# Patient Record
Sex: FEMALE | URBAN - METROPOLITAN AREA
[De-identification: names, ages, dates, MRNs, and addresses within clinical notes are randomized per-mention and may not be internally consistent; named-entity substitution may affect disease eponyms.]

---

## 2019-03-01 ENCOUNTER — OFFICE VISIT (OUTPATIENT)
Dept: FAMILY MEDICINE CLINIC | Age: 54
End: 2019-03-01

## 2019-03-01 VITALS
HEART RATE: 61 BPM | OXYGEN SATURATION: 96 % | DIASTOLIC BLOOD PRESSURE: 75 MMHG | SYSTOLIC BLOOD PRESSURE: 115 MMHG | TEMPERATURE: 97.1 F | WEIGHT: 271 LBS | HEIGHT: 66 IN | BODY MASS INDEX: 43.55 KG/M2 | RESPIRATION RATE: 18 BRPM

## 2019-03-01 DIAGNOSIS — R10.9 RIGHT FLANK PAIN: ICD-10-CM

## 2019-03-01 DIAGNOSIS — M25.551 RIGHT HIP PAIN: ICD-10-CM

## 2019-03-01 DIAGNOSIS — F41.9 ANXIETY: ICD-10-CM

## 2019-03-01 DIAGNOSIS — E66.01 OBESITY, MORBID (HCC): ICD-10-CM

## 2019-03-01 DIAGNOSIS — I10 ESSENTIAL HYPERTENSION: Primary | ICD-10-CM

## 2019-03-01 LAB
BILIRUB UR QL STRIP: NEGATIVE
GLUCOSE UR-MCNC: NEGATIVE MG/DL
KETONES P FAST UR STRIP-MCNC: NEGATIVE MG/DL
PH UR STRIP: 5.5 [PH] (ref 4.6–8)
PROT UR QL STRIP: NEGATIVE
SP GR UR STRIP: 1.02 (ref 1–1.03)
UA UROBILINOGEN AMB POC: NORMAL (ref 0.2–1)
URINALYSIS CLARITY POC: NORMAL
URINALYSIS COLOR POC: YELLOW
URINE BLOOD POC: NEGATIVE
URINE LEUKOCYTES POC: NEGATIVE
URINE NITRITES POC: NEGATIVE

## 2019-03-01 RX ORDER — CARVEDILOL 25 MG/1
TABLET ORAL
Refills: 0 | COMMUNITY
Start: 2019-02-15 | End: 2019-05-31 | Stop reason: SDUPTHER

## 2019-03-01 RX ORDER — ENALAPRIL MALEATE 10 MG/1
20 TABLET ORAL
COMMUNITY

## 2019-03-01 RX ORDER — ENALAPRIL MALEATE 20 MG/1
TABLET ORAL
COMMUNITY
Start: 2019-03-01 | End: 2019-03-15 | Stop reason: ALTCHOICE

## 2019-03-01 RX ORDER — HYDROXYZINE 25 MG/1
25 TABLET, FILM COATED ORAL
COMMUNITY

## 2019-03-01 RX ORDER — ESCITALOPRAM OXALATE 10 MG/1
TABLET ORAL
Qty: 30 TAB | Refills: 0 | Status: SHIPPED | OUTPATIENT
Start: 2019-03-01 | End: 2019-03-15

## 2019-03-01 NOTE — PROGRESS NOTES
Chief Complaint Patient presents with [facility] Establish Care New patient Last pap on Monday and mammogram 1 month ago Moved from [place] 1.5 years ago Discuss pelvic pain and right flank pain
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
## 2019-03-01 NOTE — PATIENT INSTRUCTIONS
I have started you on an anti-depressant combo anti-anxiety medication today. The most common side effect that you can experience is nausea during the first week but this will subside. If for some reason the nausea persists for more than two weeks or any other unwanted side effect, call our office to discuss changes in your therapy. You must take this medication daily and not miss a dose. Be aware that you will not feel any difference in the way you feel until approximately two weeks after taking your pill. The full effect of your medication will be in effect at one month, at which time we will re-evaluate your progress. Remember, this is not an addictive medication, and I recommend that you take this for at least 6 months for best resolution of your symptoms. Weight loss recommendations: DIET: 
· Keep a food diary over the 4 weeks. Use an online tool like myfitnesspal.com - bring a print out of this to your next appointment Increase you protein intake with foods like eggs, yogurt, chicken, fish, chickpeas, and fruits and vegetables. Work on decreasing the carbohydrates (or sugars in your diet). This includes: sodas, sweet tea, junk food, breads, pasta, white rice, and potatoes. · Avoid regular use of alcohol (high calories) · Try to drink 6 glasses of water daily · Use desserts as a reward 1-2x per week Weight loss and dietary Guidelines. [email or web address] 
 
 
EXERCISE: 
· After dinner, walk 15-30 minutes - try to do this with a walking partner · Buy a podometer (tracks # of steps taken) and work on getting to 10,000 steps per day

## 2019-03-15 ENCOUNTER — OFFICE VISIT (OUTPATIENT)
Dept: FAMILY MEDICINE CLINIC | Age: 54
End: 2019-03-15

## 2019-03-15 VITALS
OXYGEN SATURATION: 95 % | HEIGHT: 66 IN | HEART RATE: 60 BPM | SYSTOLIC BLOOD PRESSURE: 129 MMHG | WEIGHT: 274.6 LBS | TEMPERATURE: 97.8 F | RESPIRATION RATE: 16 BRPM | BODY MASS INDEX: 44.13 KG/M2 | DIASTOLIC BLOOD PRESSURE: 79 MMHG

## 2019-03-15 DIAGNOSIS — I10 ESSENTIAL HYPERTENSION: Primary | ICD-10-CM

## 2019-03-15 DIAGNOSIS — S16.1XXD STRAIN OF NECK MUSCLE, SUBSEQUENT ENCOUNTER: ICD-10-CM

## 2019-03-15 DIAGNOSIS — F41.9 ANXIETY: ICD-10-CM

## 2019-03-15 RX ORDER — METAXALONE 800 MG/1
TABLET ORAL
COMMUNITY
Start: 2019-03-12

## 2019-03-15 RX ORDER — DICLOFENAC SODIUM 50 MG/1
TABLET, DELAYED RELEASE ORAL
COMMUNITY
Start: 2019-03-12

## 2019-03-15 NOTE — PROGRESS NOTES
Chief Complaint   Patient presents with    Neck Pain     radiating down left arm   Started last week radiating to shoulder but now radiating down left arm  Discuss right knee pain and weight
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
## 2019-05-31 RX ORDER — CARVEDILOL 25 MG/1
TABLET ORAL
Qty: 180 TAB | Refills: 0 | Status: SHIPPED | OUTPATIENT
Start: 2019-05-31 | End: 2019-09-06 | Stop reason: SDUPTHER

## 2019-09-06 RX ORDER — CARVEDILOL 25 MG/1
TABLET ORAL
Qty: 180 TAB | Refills: 0 | Status: SHIPPED | OUTPATIENT
Start: 2019-09-06

## 2019-12-30 RX ORDER — CARVEDILOL 25 MG/1
TABLET ORAL
Qty: 180 TAB | Refills: 0 | OUTPATIENT
Start: 2019-12-30

## 2023-04-28 NOTE — TELEPHONE ENCOUNTER
Letter mailed to home address to schedule follow-up appointment.
Implemented All Universal Safety Interventions:  [place] to call system. Call bell, personal items and telephone within reach. Instruct patient to call for assistance. Room bathroom lighting operational. Non-slip footwear when patient is off stretcher. Physically safe environment: no spills, clutter or unnecessary equipment. Stretcher in lowest position, wheels locked, appropriate side rails in place.